# Patient Record
Sex: FEMALE | Race: ASIAN | NOT HISPANIC OR LATINO | URBAN - METROPOLITAN AREA
[De-identification: names, ages, dates, MRNs, and addresses within clinical notes are randomized per-mention and may not be internally consistent; named-entity substitution may affect disease eponyms.]

---

## 2022-01-20 ENCOUNTER — ANCILLARY PROCEDURE (OUTPATIENT)
Dept: GENERAL RADIOLOGY | Facility: CLINIC | Age: 52
End: 2022-01-20
Attending: FAMILY MEDICINE
Payer: COMMERCIAL

## 2022-01-20 ENCOUNTER — OFFICE VISIT (OUTPATIENT)
Dept: FAMILY MEDICINE | Facility: CLINIC | Age: 52
End: 2022-01-20
Payer: COMMERCIAL

## 2022-01-20 VITALS
TEMPERATURE: 98.3 F | RESPIRATION RATE: 16 BRPM | WEIGHT: 151.6 LBS | HEIGHT: 59 IN | SYSTOLIC BLOOD PRESSURE: 133 MMHG | HEART RATE: 68 BPM | OXYGEN SATURATION: 94 % | BODY MASS INDEX: 30.56 KG/M2 | DIASTOLIC BLOOD PRESSURE: 88 MMHG

## 2022-01-20 DIAGNOSIS — S52.502A CLOSED FRACTURE OF DISTAL END OF LEFT RADIUS, UNSPECIFIED FRACTURE MORPHOLOGY, INITIAL ENCOUNTER: ICD-10-CM
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
## 2022-01-20 PROCEDURE — 73090 X-RAY EXAM OF FOREARM: CPT | Mod: LT | Performed by: RADIOLOGY

## 2022-01-20 PROCEDURE — 99203 OFFICE O/P NEW LOW 30 MIN: CPT | Mod: GC

## 2022-01-20 RX ORDER — ACETAMINOPHEN 500 MG
500-1000 TABLET ORAL EVERY 6 HOURS PRN
Qty: 60 TABLET | Refills: 1 | Status: SHIPPED | OUTPATIENT
Start: 2022-01-20 | End: 2023-10-17

## 2022-01-20 RX ORDER — IBUPROFEN 600 MG/1
600 TABLET, FILM COATED ORAL EVERY 6 HOURS PRN
Qty: 60 TABLET | Refills: 1 | Status: SHIPPED | OUTPATIENT
Start: 2022-01-20 | End: 2023-11-28

## 2022-01-20 ASSESSMENT — MIFFLIN-ST. JEOR: SCORE: 1188.53

## 2022-01-20 NOTE — LETTER
January 20, 2022      Jillian Ree  [address]  [place] [address]              I saw Jillian on 1/20/2022 in my clinic. Please excuse her from work for 1 week.          Sincerely,      Rusty Tomas DO

## 2022-01-20 NOTE — PROGRESS NOTES
Assessment and Plan      Diagnoses and all orders for this visit:    Fall, initial encounter  Closed Fracture of Distal end of Left Radius  Fall on outstretched hand today. Tenderness and swelling with normal sensation of left hand. Tenderness over left anatomic snuff box. X-ray revealed minimal cortical displacement along the distal radial metaphysis suspicious for nondisplaced fracture. Patient splinted and will return in one week. Will reassess with new x ray.        -     XR Forearm Left 2 Views; Future  -     XR Wrist Right 2 Views; Future  -     acetaminophen (TYLENOL) 500 MG tablet; Take 1-2 tablets (500-1,000 mg) by mouth every 6 hours as needed for mild pain  -     ibuprofen (ADVIL/MOTRIN) 600 MG tablet; Take 1 tablet (600 mg) by mouth every 6 hours as needed for moderate pain    Options for treatment and follow-up care were reviewed with the patient. Patient engaged in the decision making process and verbalized understanding of the options discussed and agreed with the final plan.    Patient was staffed with attending physician Dr. Madonna Tomas, DO PGY1           HPI       Jillian Parekh is a 52 year old year old female w/ PMH of   Patient Active Problem List   Diagnosis     Chest pain     Premature atrial complexes    who presents for Left arm pain.    Jillian fell this morning while walking outside after slipping on ice. She states that she fell and put her left arm back to try and catch herself and fell on her outstretched arm. She notes increased swelling and pain, but reports no change in sensation. She states she is able to move her arm and fingers fine with just some difficulty secondary to pain.     A paOnde  was used for  this visit.             Review of Systems:   10 point ROS negative other than as specified above.         Physical Exam:   /88 (BP Location: Right arm, Patient Position: Sitting, Cuff Size: Adult Regular)   Pulse 68   Temp 98.3  F (36.8  C) (Oral)   Resp 16  "  Ht 1.491 m (4' 10.7\")   Wt 68.8 kg (151 lb 9.6 oz)   SpO2 94%   BMI 30.93 kg/m       Exam:  Constitutional: healthy, alert, no distress, and cooperative  Head: Normocephalic. No masses, lesions, tenderness or abnormalities  Neck: Neck supple. No adenopathy.  ENT: throat normal without erythema or exudate  Cardiovascular: RRR w/o audible murmur  Respiratory: bilateral clear lungs w/o wheezing, crackles or rhonchi; breathing comfortably on RA  Gastrointestinal: Abdomen soft, non-tender. BS normal.  Musculoskeletal:Left forearm swelling and tenderness. Normal sensation of LUE. Normal movement of all 5 digits. Does note tenderness in L anatomic snuff box.   Skin: no suspicious lesions or rashes  Neurologic: grossly normal CN; normal strength, sensation, & tone  Psychiatric: mentation appears normal and affect normal/bright        Results:      Results from this visit  Results for orders placed or performed in visit on 01/20/22   XR Forearm Left 2 Views     Status: None    Narrative    EXAM: XR FOREARM LEFT 2 VIEWS  LOCATION: [facility]  DATE/TIME: 1/20/2022 5:08 PM    INDICATION:  Fall, initial encounter  COMPARISON: None.      Impression    IMPRESSION: There is a small focus of minimal cortical displacement along the distal radial metaphysis suspicious for nondisplaced fracture. Wrist x-rays could assess further. Punctate likely chronic bone fragment projecting near and distal to the ulnar   styloid.         "

## 2022-01-20 NOTE — NURSING NOTE
Due to patient being non-English speaking/uses sign language, an  was used for this visit. Only for face-to-face interpretation by an external agency, date and length of interpretation can be found on the scanned worksheet.     name: Mariluz Em   Agency: [employer]  Language: Sheri   Telephone number:   Type of interpretation: Face-to-face, spoken

## 2022-01-20 NOTE — PROGRESS NOTES
Preceptor Attestation:    I discussed the patient with the resident and evaluated the patient in person. I was present for and supervised the entire procedure. I have verified the content of the note, which accurately reflects my assessment of the patient and the plan of care.   Supervising Physician:  William Peacock MD.

## 2022-01-24 ENCOUNTER — ANCILLARY PROCEDURE (OUTPATIENT)
Dept: GENERAL RADIOLOGY | Facility: CLINIC | Age: 52
End: 2022-01-24
Attending: FAMILY MEDICINE
Payer: COMMERCIAL
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
## 2022-01-24 PROCEDURE — 73100 X-RAY EXAM OF WRIST: CPT | Mod: RT | Performed by: RADIOLOGY

## 2023-10-17 ENCOUNTER — OFFICE VISIT (OUTPATIENT)
Dept: FAMILY MEDICINE | Facility: CLINIC | Age: 53
End: 2023-10-17
Payer: COMMERCIAL

## 2023-10-17 VITALS
BODY MASS INDEX: 30.73 KG/M2 | TEMPERATURE: 97.7 F | WEIGHT: 150.6 LBS | HEART RATE: 55 BPM | SYSTOLIC BLOOD PRESSURE: 109 MMHG | DIASTOLIC BLOOD PRESSURE: 64 MMHG | OXYGEN SATURATION: 98 %

## 2023-10-17 DIAGNOSIS — Z11.59 NEED FOR HEPATITIS C SCREENING TEST: ICD-10-CM

## 2023-10-17 DIAGNOSIS — Z11.4 SCREENING FOR HIV (HUMAN IMMUNODEFICIENCY VIRUS): ICD-10-CM

## 2023-10-17 DIAGNOSIS — Z12.11 SCREEN FOR COLON CANCER: ICD-10-CM

## 2023-10-17 DIAGNOSIS — Z12.31 VISIT FOR SCREENING MAMMOGRAM: Primary | ICD-10-CM

## 2023-10-17 DIAGNOSIS — M79.601 RIGHT ARM PAIN: ICD-10-CM
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
## 2023-10-17 DIAGNOSIS — Z12.4 CERVICAL CANCER SCREENING: ICD-10-CM

## 2023-10-17 PROCEDURE — 99214 OFFICE O/P EST MOD 30 MIN: CPT | Performed by: FAMILY MEDICINE

## 2023-10-17 NOTE — PROGRESS NOTES
Assessment & Plan       Right Arm Pain  Jillian Parekh presents today for right arm pain and chest pain. These are likely a Right Upper Extremity Radiculopathy given the burning sensation, numbness and tingling, and positive Spurling test. This is unlikely to be ACS or MI given negative leftward radiation, worsening with exertion, improvement with rest. She had a negative Stress Echo in 2/17/16. Will start her on Gabapentin 300mg daily and uptitrate to 300mg BID. If no improvement with this, will consider imaging and possible neurosurgical referral. Patient not currently interested in PT at this time because she does not have time to go see them with her job.   - Gabapentin 300mg at bedtime for 7 days, then 300mg BID for 21 days.  - Follow up in 1 month to assess arm pain and complete preventative health needs.     Abdi Hernandez MS3  [facility] [employer] [facility]    Bill Walsh is a 53 year old, presenting for the following health issues:  Chest Pain (Chest pain started x 4- 5 months. Pain radiating to right arm with tingling and numbing. )      10/17/2023     9:07 AM   Additional Questions   Roomed by sundar   Accompanied by self       HPI     Jillian presents today with right arm pain and right chest pain. 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 She describes the pain as burning with numbness and tingling. This also causes her arm to feel tired. This pain radiates all the way to her right chest and she feels as though the chest pain and arm pain are related. The pain randomly starts, especially when in bed, it can stop for a few minutes randomly, but then comes right back. She has tried creams and massage without relief. She is able to move her arm in all directions. She denies any pain radiation to her left chest, arm, neck, or jaw. She denies any shortness of breath or worsening pain with exertion. She denies any squeezing pressure in her chest.  Rest does not improve her pain.     Review of Systems   Constitutional, HEENT, cardiovascular, pulmonary, gi and gu systems are negative, except as otherwise noted.      Objective    /64 (BP Location: Left arm, Patient Position: Sitting, Cuff Size: Adult Large)   Pulse 55   Temp 97.7  F (36.5  C) (Tympanic)   Wt 68.3 kg (150 lb 9.6 oz)   SpO2 98%   BMI 30.73 kg/m    Body mass index is 30.73 kg/m .  Physical Exam   GENERAL: healthy, alert and no distress  NECK: no adenopathy, no asymmetry, masses, or scars and thyroid normal to palpation  RESP: lungs clear to auscultation - no rales, rhonchi or wheezes  CV: regular rate and rhythm, normal S1 S2, no S3 or S4, no murmur, click or rub, no peripheral edema and peripheral pulses strong  MS: normal muscle tone, normal range of motion, no edema, RUE exam shows no erythema, induration, or nodules and 4/5 strength in all motions, and LUE exam shows normal strength and muscle mass  NEURO: sensory exam grossly normal except light touch and pinprick of RUE causing burning pain, mentation intact. Positive Spurling test to right.     ----- Services Performed by a MEDICAL STUDENT in Presence of ATTENDING Physician-------

## 2023-10-17 NOTE — PROGRESS NOTES
I was present with the medical student who participated in the service and in the documentation of this note. I have verified the history and personally performed the physical exam and medical decision making, and have verified the content of the note, which accurately reflects my assessment of the patient and the plan of care.    Presenting for right arm pain, burning-like sensation.  Positive Spurling's on exam, but otherwise no focal neurologic deficits.  -- Will manage conservatively at this point.  Declines PT.  Add gabapentin.  -- Low concern for cardiac etiology.  Normal Stress Echo in past.  No dyspnea, occurrence with exertion, etc.  -- If not improving, consider C-spine imaging.     Christi Tapia MD

## 2023-10-22 RX ORDER — IBUPROFEN 600 MG/1
600 TABLET, FILM COATED ORAL EVERY 6 HOURS PRN
Qty: 90 TABLET | Refills: 0 | Status: SHIPPED | OUTPATIENT
Start: 2023-10-22 | End: 2023-12-11

## 2023-10-22 RX ORDER — GABAPENTIN 300 MG/1
CAPSULE ORAL
Qty: 49 CAPSULE | Refills: 0 | Status: SHIPPED | OUTPATIENT
Start: 2023-10-22 | End: 2023-11-09

## 2023-10-22 RX ORDER — ACETAMINOPHEN 500 MG
500-1000 TABLET ORAL EVERY 6 HOURS PRN
Qty: 60 TABLET | Refills: 1 | Status: SHIPPED | OUTPATIENT
Start: 2023-10-22 | End: 2023-12-11

## 2023-11-09 ENCOUNTER — OFFICE VISIT (OUTPATIENT)
Dept: FAMILY MEDICINE | Facility: CLINIC | Age: 53
End: 2023-11-09
Payer: COMMERCIAL

## 2023-11-09 VITALS
HEART RATE: 56 BPM | OXYGEN SATURATION: 97 % | TEMPERATURE: 97.7 F | WEIGHT: 150.4 LBS | DIASTOLIC BLOOD PRESSURE: 90 MMHG | SYSTOLIC BLOOD PRESSURE: 153 MMHG | RESPIRATION RATE: 18 BRPM | HEIGHT: 59 IN | BODY MASS INDEX: 30.32 KG/M2

## 2023-11-09 DIAGNOSIS — Z12.31 VISIT FOR SCREENING MAMMOGRAM: Primary | ICD-10-CM

## 2023-11-09 DIAGNOSIS — Z12.11 SCREEN FOR COLON CANCER: ICD-10-CM

## 2023-11-09 DIAGNOSIS — M79.601 RIGHT ARM PAIN: ICD-10-CM

## 2023-11-09 DIAGNOSIS — Z11.59 NEED FOR HEPATITIS C SCREENING TEST: ICD-10-CM

## 2023-11-09 DIAGNOSIS — Z11.4 SCREENING FOR HIV (HUMAN IMMUNODEFICIENCY VIRUS): ICD-10-CM

## 2023-11-09 DIAGNOSIS — Z23 NEED FOR PROPHYLACTIC VACCINATION AND INOCULATION AGAINST INFLUENZA: ICD-10-CM

## 2023-11-09 DIAGNOSIS — R03.0 ELEVATED BLOOD PRESSURE READING WITHOUT DIAGNOSIS OF HYPERTENSION: ICD-10-CM

## 2023-11-09 DIAGNOSIS — Z13.220 SCREENING FOR HYPERLIPIDEMIA: ICD-10-CM

## 2023-11-09 LAB
ANION GAP SERPL CALCULATED.3IONS-SCNC: 10 MMOL/L (ref 7–15)
BUN SERPL-MCNC: 9.9 MG/DL (ref 6–20)
CALCIUM SERPL-MCNC: 9.9 MG/DL (ref 8.6–10)
CHLORIDE SERPL-SCNC: 105 MMOL/L (ref 98–107)
CHOLEST SERPL-MCNC: 225 MG/DL
CREAT SERPL-MCNC: 0.78 MG/DL (ref 0.51–0.95)
DEPRECATED HCO3 PLAS-SCNC: 27 MMOL/L (ref 22–29)
EGFRCR SERPLBLD CKD-EPI 2021: 89 ML/MIN/1.73M2
GLUCOSE SERPL-MCNC: 98 MG/DL (ref 70–99)
HDLC SERPL-MCNC: 55 MG/DL
LDLC SERPL CALC-MCNC: 136 MG/DL
NONHDLC SERPL-MCNC: 170 MG/DL
POTASSIUM SERPL-SCNC: 4.6 MMOL/L (ref 3.4–5.3)
SODIUM SERPL-SCNC: 142 MMOL/L (ref 135–145)
TRIGL SERPL-MCNC: 171 MG/DL

## 2023-11-09 PROCEDURE — 80048 BASIC METABOLIC PNL TOTAL CA: CPT

## 2023-11-09 PROCEDURE — 80061 LIPID PANEL: CPT

## 2023-11-09 PROCEDURE — 99214 OFFICE O/P EST MOD 30 MIN: CPT | Mod: 25

## 2023-11-09 PROCEDURE — 86803 HEPATITIS C AB TEST: CPT

## 2023-11-09 PROCEDURE — 87389 HIV-1 AG W/HIV-1&-2 AB AG IA: CPT

## 2023-11-09 PROCEDURE — 90686 IIV4 VACC NO PRSV 0.5 ML IM: CPT

## 2023-11-09 PROCEDURE — 90471 IMMUNIZATION ADMIN: CPT

## 2023-11-09 PROCEDURE — 36415 COLL VENOUS BLD VENIPUNCTURE: CPT

## 2023-11-09 RX ORDER — GABAPENTIN 300 MG/1
CAPSULE ORAL
Qty: 49 CAPSULE | Refills: 0 | Status: SHIPPED | OUTPATIENT
Start: 2023-11-09 | End: 2023-12-11

## 2023-11-09 NOTE — PROGRESS NOTES
Preceptor Attestation:   Patient seen, evaluated and discussed with the resident. I have verified the content of the note, which accurately reflects my assessment of the patient and the plan of care.   Supervising Physician:  Charles Bethea MD    Gen: Denies fever, weight loss; +generalized malaise  HEENT: Denies vision changes, ear pain, epistaxis, sore throat  CV: Denies chest pain, palpitations  Skin: Denies rash, erythema, color changes  Resp: Denies SOB, cough  Endo: Denies sensitivity to heat, cold, increased urination  GI: Denies abdominal pain, constipation, vomiting, diarrhea; +nausea  Msk: Denies back pain, LE swelling, extremity pain  : Denies dysuria, increased frequency  Neuro: Denies LOC, weakness, numbness, tingling  Psych: Denies hx of psych, hallucinations  ROS statement: all other ROS negative except as per HPI

## 2023-11-09 NOTE — PROGRESS NOTES
Assessment & Plan     Right arm pain  In for follow-up of right arm pain likely radiculopathy as patient with burning sensation in the right arm.  Did not get gabapentin at last visit, we will reorder that and have patient call if they are unable to obtain that medication.  As for The consistency of her symptoms, she spoke last visit about possible neurosurgical referral or further imaging which patient declined at this time.  Would like to try medication management for 1 month first before resorting to that.  I think that that is well within reason and will follow-up in 1 month.  - gabapentin (NEURONTIN) 300 MG capsule; Take 1 capsule (300 mg) by mouth at bedtime for 7 days, THEN 1 capsule (300 mg) 2 times daily for 21 days.    Visit for screening mammogram  - MA SCREENING DIGITAL BILAT - Future  (s+30); Future    Screen for colon cancer  - Colonoscopy Screening  Referral; Future    Screening for HIV (human immunodeficiency virus)  - HIV Screening; Future  - HIV Screening    Need for hepatitis C screening test  - Hepatitis C Screen Reflex to HCV RNA Quant and Genotype; Future  - Hepatitis C Screen Reflex to HCV RNA Quant and Genotype    Need for prophylactic vaccination and inoculation against influenza  Flu shot today.    Screening for hyperlipidemia  - Lipid panel reflex to direct LDL Non-fasting; Future  - Lipid panel reflex to direct LDL Non-fasting    Elevated blood pressure reading without diagnosis of hypertension  Elevated blood pressure reading x2 today.  Previously with blood pressures mildly elevated.  Will recheck next time and have discussion of medication management.  BMP today.  - Basic metabolic panel; Future  - Basic metabolic panel    Return in about 4 weeks (around 12/7/2023) for Follow up.    Rusty Tomas DO  [facility] SKY Walsh is a 53 year old, presenting for the following health issues:  Follow Up (Arm pain, tingling. Still the same, pt didn't  " med arabella there was not medication when she )      11/9/2023     9:40 AM   Additional Questions   Roomed by ML   Accompanied by self       HPI   Here for follow-up of right arm pain.  Reports that she did not get the gabapentin as she went to the pharmacy and it was not available for her.  Continues to endorse similar symptoms.  No decrease in strength in that right arm but does have decreased sensation and numbness, tingling, burning.  It is intermittent but fairly consistent throughout the month as being on and off.  New symptoms.  No systemic symptoms.    Review of Systems   Constitutional, HEENT, cardiovascular, pulmonary, gi and gu systems are negative, except as otherwise noted.      Objective    BP (!) 153/90 (BP Location: Right arm, Patient Position: Sitting, Cuff Size: Adult Regular)   Pulse 56   Temp 97.7  F (36.5  C) (Oral)   Resp 18   Ht 1.49 m (4' 10.66\")   Wt 68.2 kg (150 lb 6.4 oz)   SpO2 97%   BMI 30.73 kg/m    Body mass index is 30.73 kg/m .  Physical Exam   GENERAL: healthy, alert and no distress  NECK: no adenopathy, no asymmetry, masses, or scars and thyroid normal to palpation  RESP: lungs clear to auscultation - no rales, rhonchi or wheezes  CV: regular rate and rhythm, normal S1 S2, no S3 or S4, no murmur, click or rub, no peripheral edema and peripheral pulses strong  ABDOMEN: soft, nontender, no hepatosplenomegaly, no masses and bowel sounds normal  MS: Normal strength bilateral upper extremities.  Reports different sensation and the entire right upper extremity including burning sensation.  Positive Spurling's test on the right.    No results found for this or any previous visit (from the past 24 hour(s)).    ----- Service Performed and Documented by Resident or Fellow ------              "

## 2023-11-10 LAB
HCV AB SERPL QL IA: NONREACTIVE
HIV 1+2 AB+HIV1 P24 AG SERPL QL IA: NONREACTIVE

## 2023-11-28 ENCOUNTER — ANCILLARY PROCEDURE (OUTPATIENT)
Dept: GENERAL RADIOLOGY | Facility: CLINIC | Age: 53
End: 2023-11-28
Payer: COMMERCIAL

## 2023-11-28 ENCOUNTER — OFFICE VISIT (OUTPATIENT)
Dept: FAMILY MEDICINE | Facility: CLINIC | Age: 53
End: 2023-11-28
Payer: COMMERCIAL

## 2023-11-28 VITALS
HEART RATE: 55 BPM | BODY MASS INDEX: 30.89 KG/M2 | WEIGHT: 151.2 LBS | TEMPERATURE: 97.8 F | OXYGEN SATURATION: 97 % | SYSTOLIC BLOOD PRESSURE: 134 MMHG | RESPIRATION RATE: 24 BRPM | DIASTOLIC BLOOD PRESSURE: 79 MMHG
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
## 2023-11-28 DIAGNOSIS — R07.89 CHEST WALL PAIN: ICD-10-CM
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
## 2023-11-28 PROBLEM — M79.18 MYOFASCIAL PAIN: Status: ACTIVE | Noted: 2019-01-11

## 2023-11-28 PROBLEM — H91.93 BILATERAL HEARING LOSS: Status: ACTIVE | Noted: 2019-01-11

## 2023-11-28 PROCEDURE — 71110 X-RAY EXAM RIBS BIL 3 VIEWS: CPT | Mod: TC | Performed by: RADIOLOGY

## 2023-11-28 PROCEDURE — 99213 OFFICE O/P EST LOW 20 MIN: CPT | Mod: GC

## 2023-11-28 RX ORDER — IBUPROFEN 600 MG/1
600 TABLET, FILM COATED ORAL EVERY 6 HOURS PRN
Qty: 60 TABLET | Refills: 1 | Status: SHIPPED | OUTPATIENT
Start: 2023-11-28

## 2023-11-28 RX ORDER — ACETAMINOPHEN 500 MG
500-1000 TABLET ORAL EVERY 6 HOURS PRN
Qty: 90 TABLET | Refills: 0 | Status: SHIPPED | OUTPATIENT
Start: 2023-11-28

## 2023-11-28 NOTE — PROGRESS NOTES
Preceptor Attestation:    I discussed the patient with the resident and evaluated the patient in person. I personally reviewed the imaging and agree with the interpretation documented by the resident. I have verified the content of the note, which accurately reflects my assessment of the patient and the plan of care.   Supervising Physician:  Jimmy Grewal MD.

## 2023-11-28 NOTE — PROGRESS NOTES
"  Assessment & Plan     Fall, initial encounter  Chest wall pain  R/o rib fracture  Patient fell 11/22 in yard on concrete on left side.  Noticed mild pain immediately on impact, grew more severe as the week went on.  No pain with deep breaths, does report severe pain with cough.  Concern for rib fracture.  Fifth rib pain along axillary line, good aeration, no pain with deep breaths on exam.  X-ray findings to rule out fracture, symptoms seem more consistent with MSK injury.  Advised ibuprofen and Tylenol for now.  If rib fracture found, will recommend low-dose oxycodone if needed.  - ibuprofen (ADVIL/MOTRIN) 600 MG tablet; Take 1 tablet (600 mg) by mouth every 6 hours as needed for moderate pain  - acetaminophen (TYLENOL) 500 MG tablet; Take 1-2 tablets (500-1,000 mg) by mouth every 6 hours as needed for mild pain  - XR Ribs Bilateral 2 Views; Future    Diagnosis or treatment significantly limited by social determinants of health - limited English, physically demanding job  Ordering of each unique test  Prescription drug management  30 minutes spent by me on the date of the encounter doing chart review, history and exam, documentation and further activities per the note       BMI:   Estimated body mass index is 30.89 kg/m  as calculated from the following:    Height as of 11/9/23: 1.49 m (4' 10.66\").    Weight as of this encounter: 68.6 kg (151 lb 3.2 oz).   Weight management plan: Patient was referred to their PCP to discuss a diet and exercise plan.    MEDICATIONS:   Orders Placed This Encounter   Medications    ibuprofen (ADVIL/MOTRIN) 600 MG tablet     Sig: Take 1 tablet (600 mg) by mouth every 6 hours as needed for moderate pain     Dispense:  60 tablet     Refill:  1    acetaminophen (TYLENOL) 500 MG tablet     Sig: Take 1-2 tablets (500-1,000 mg) by mouth every 6 hours as needed for mild pain     Dispense:  90 tablet     Refill:  0          - Continue other medications without change    Return if symptoms " worsen or fail to improve.    Haley Hoffman MD  PGY3 Family Medicine Resident  [facility] SKY Walsh is a 53 year old, presenting for the following health issues:  Fall (Pt fell on 11/22/23 on the ground and hurt her Left side ribs - worried she may have broken a rib.  ) and Chest Pain (Rib pain - pain whenever she coughs there is a lot of pain. When she breaths there is a little discomfort. )        11/28/2023    11:32 AM   Additional Questions   Roomed by COLTON garcía MA   Accompanied by Self       HPI   Fall with rib pain    Patient was at work outside in the yard when fell 11/22. Slipped on ice and hit L chest wall on ground (concrete). Tried to protect self with arm. Pain immediately afterwards, was mild, grew in severity over the past few days. Pain with cough, not so much with deep breath, pain with reaching and holding heavy things.       Review of Systems   Constitutional, HEENT, cardiovascular, pulmonary, gi and gu systems are negative, except as otherwise noted.      Objective    /79   Pulse 55   Temp 97.8  F (36.6  C) (Oral)   Resp 24   Wt 68.6 kg (151 lb 3.2 oz)   SpO2 97%   BMI 30.89 kg/m    Body mass index is 30.89 kg/m .  Physical Exam   GENERAL: healthy, alert and no distress  RESP: lungs clear to auscultation - no rales, rhonchi or wheezes, good deep breaths without pain  CV: regular rate and rhythm, normal S1 S2, no S3 or S4, no murmur, click or rub, no peripheral edema and peripheral pulses strong  ABDOMEN: soft, nontender, no hepatosplenomegaly, no masses and bowel sounds normal  MS: no gross musculoskeletal defects noted, no edema, chest wall tender to palpation at 5th rib at axillary line  PSYCH: mentation appears normal, affect normal/bright    No results found for this visit on 11/28/23.    ----- Service Performed and Documented by Resident or Fellow ------

## 2023-11-28 NOTE — PATIENT INSTRUCTIONS
He was your x-ray results within the next day.  In the meantime, you can take Tylenol and ibuprofen as needed for your pain.

## 2023-12-11 ENCOUNTER — OFFICE VISIT (OUTPATIENT)
Dept: FAMILY MEDICINE | Facility: CLINIC | Age: 53
End: 2023-12-11
Payer: COMMERCIAL

## 2023-12-11 VITALS
DIASTOLIC BLOOD PRESSURE: 86 MMHG | TEMPERATURE: 98.2 F | HEIGHT: 59 IN | SYSTOLIC BLOOD PRESSURE: 137 MMHG | HEART RATE: 57 BPM | RESPIRATION RATE: 18 BRPM | OXYGEN SATURATION: 98 % | WEIGHT: 147.8 LBS | BODY MASS INDEX: 29.8 KG/M2

## 2023-12-11 DIAGNOSIS — M54.12 CERVICAL RADICULOPATHY: ICD-10-CM

## 2023-12-11 DIAGNOSIS — M79.601 RIGHT ARM PAIN: ICD-10-CM

## 2023-12-11 DIAGNOSIS — E78.5 HYPERLIPIDEMIA LDL GOAL <130: Primary | ICD-10-CM

## 2023-12-11 LAB — HBA1C MFR BLD: 5.9 % (ref 0–5.6)

## 2023-12-11 PROCEDURE — 36415 COLL VENOUS BLD VENIPUNCTURE: CPT

## 2023-12-11 PROCEDURE — 83036 HEMOGLOBIN GLYCOSYLATED A1C: CPT

## 2023-12-11 PROCEDURE — 99214 OFFICE O/P EST MOD 30 MIN: CPT | Mod: 25

## 2023-12-11 PROCEDURE — 90715 TDAP VACCINE 7 YRS/> IM: CPT

## 2023-12-11 PROCEDURE — 90471 IMMUNIZATION ADMIN: CPT

## 2023-12-11 RX ORDER — GABAPENTIN 300 MG/1
300 CAPSULE ORAL 3 TIMES DAILY
Qty: 90 CAPSULE | Refills: 0 | Status: SHIPPED | OUTPATIENT
Start: 2023-12-11 | End: 2024-01-10

## 2023-12-11 NOTE — PROGRESS NOTES
Prior to immunization administration, verified patients identity using patient s name and date of birth. Please see Immunization Activity for additional information.     Screening Questionnaire for Adult Immunization    Are you sick today?   No   Do you have allergies to medications, food, a vaccine component or latex?   No   Have you ever had a serious reaction after receiving a vaccination?   No   Do you have a long-term health problem with heart, lung, kidney, or metabolic disease (e.g., diabetes), asthma, a blood disorder, no spleen, complement component deficiency, a cochlear implant, or a spinal fluid leak?  Are you on long-term aspirin therapy?   No   Do you have cancer, leukemia, HIV/AIDS, or any other immune system problem?   No   Do you have a parent, brother, or sister with an immune system problem?   No   In the past 3 months, have you taken medications that affect  your immune system, such as prednisone, other steroids, or anticancer drugs; drugs for the treatment of rheumatoid arthritis, Crohn s disease, or psoriasis; or have you had radiation treatments?   No   Have you had a seizure, or a brain or other nervous system problem?   No   During the past year, have you received a transfusion of blood or blood    products, or been given immune (gamma) globulin or antiviral drug?   No   For women: Are you pregnant or is there a chance you could become       pregnant during the next month?   No   Have you received any vaccinations in the past 4 weeks?   No     Immunization questionnaire answers were all negative.      Patient instructed to remain in clinic for 15 minutes afterwards, and to report any adverse reactions.     Screening performed by Stewart Caldera on 12/11/2023 at 10:31 AM.

## 2023-12-11 NOTE — PROGRESS NOTES
Preceptor Attestation:    I discussed the patient with the resident and evaluated the patient in person. I have verified the content of the note, which accurately reflects my assessment of the patient and the plan of care.   Supervising Physician:  Donny Jarvis MD.

## 2023-12-11 NOTE — PROGRESS NOTES
"  Assessment & Plan     Cervical radiculopathy  Right arm pain  Reporting improved pain on gabapentin.  Will increase dose to 300 3 times daily.  Again declining further imaging or referral.  - gabapentin (NEURONTIN) 300 MG capsule; Take 1 capsule (300 mg) by mouth 3 times daily for 30 days    Hyperlipidemia LDL goal <130  History of hyperlipidemia and elevated glucose.  Will check with A1c.  - Hemoglobin A1c; Future    Return in about 4 weeks (around 1/8/2024) for Follow up, with me.    Rusty Tomas DO  [facility] SKY Walsh is a 53 year old, presenting for the following health issues:  Arm Pain (Pain, tingling  still the same ) and X-ray Results (Pt would like to know her result )    HPI   She is in to discuss follow-up of her recent fall in the x-ray imaging.  We discussed that this was negative for any fracture.  She still endorses mild left-sided chest wall pain.  Tender to palpation.  This is overall improving.  Has tried Tylenol and ibuprofen with mild improvement.    Also discussing right arm burning pain and tingling.  Reports that the gabapentin 300 mg has improved her pain.  Again declines any further workup or management at this time as she would just like to consider conservative management.  Reports that this is improved significantly with her gabapentin.  Denies any other symptoms.    Review of Systems   Constitutional, HEENT, cardiovascular, pulmonary, gi and gu systems are negative, except as otherwise noted.      Objective    /86 (BP Location: Right arm, Patient Position: Sitting, Cuff Size: Adult Regular)   Pulse 57   Temp 98.2  F (36.8  C) (Oral)   Resp 18   Ht 1.49 m (4' 10.66\")   Wt 67 kg (147 lb 12.8 oz)   SpO2 98%   BMI 30.20 kg/m    Body mass index is 30.2 kg/m .  Physical Exam   GENERAL: healthy, alert and no distress  NECK: no adenopathy, no asymmetry, masses, or scars and thyroid normal to palpation  RESP: lungs clear to auscultation - no " rales, rhonchi or wheezes  CV: regular rate and rhythm, normal S1 S2, no S3 or S4, no murmur, click or rub, no peripheral edema and peripheral pulses strong  ABDOMEN: soft, nontender, no hepatosplenomegaly, no masses and bowel sounds normal  MS: Normal sensation and strength in bilateral upper extremities.  Reporting burning sensation into the right hand.  Tenderness to palpation over left lateral ribs.    No results found for this or any previous visit (from the past 24 hour(s)).    ----- Service Performed and Documented by Resident or Fellow ------

## 2023-12-11 NOTE — COMMUNITY RESOURCES LIST (ENGLISH)
12/11/2023   [facility] - Outpatient Clinics  N/A  For additional resource needs, please contact your health insurance member services or your primary care team.  Phone: [phone]   Email: N/A   Address: [facility] [place] [address]   Hours: N/A        Hotlines and Helplines       Hotline - Housing crisis  1  [facility] Distance: 6.83 miles      Phone/Virtual   [address] [place] [address]  Language: English  Hours: Mon - Sun Open 24 Hours   Phone: (629) 398-6423 Email: communications@[facility].[facility] Website: https://[facility].org/oursaviourshousing/     2  [facility] Distance: 8.43 miles      Phone/Virtual   [address] [place] [address]  Language: English  Hours: Mon - Sun Open 24 Hours   Phone: (392) 157-7763 Email: info@[facility].org Website: http://www.[facility].org          Housing       Coordinated Entry access point  3  [facility] Distance: 1.48 miles      In-Person, Phone/Virtual   [address] [place] [address]  Language: English  Hours: Mon - Fri 8:30 AM - 4:30 PM  Fees: Free   Phone: (253) 140-5506 Email: info@[facility].org Website: https://www.[facility].org/locations/[facility]/     4  [facility] - Coordinated Access to Housing and Shelter ([facility]) - Coordinated Access - Coordinated Entry access point Distance: 1.61 miles      In-Person, Phone/Virtual   [address] [place] [address]  Language: English  Hours: Mon - Fri 8:00 AM - 4:30 PM  Fees: Free   Phone: (413) 650-2645 Website: https://www.[facility]./residents/assistance-support/assistance/housing-services-support     Drop-in center or day shelter  5  [facility] Distance: 2.36 miles      In-Person   [address] [place] [address]  Language: English  Hours: Mon - Fri 9:00 AM - 4:00 PM  Fees: Free   Phone: (363) 027-9810 Email: frontelfegok@[facility].org Website:  http://[facility].org     6  [facility] Distance: 6.87 miles      In-Person   [address] [place] [address]  Language: English, Cook Islander, Indonesian  Hours: Mon - Sat 7:00 AM - 3:00 PM  Fees: Free, Self Pay   Phone: (786) 907-2194 Email: info@[employer] Website: https://www.[facility]/locations/[facility]/     Housing search assistance  7  [employer] - https://[employer]/ Distance: 7.05 miles      Phone/Virtual   [address] [place] [address]  Language: English  Hours: Mon - Sun Open 24 Hours   Email: info@[employer] Website: https://[employer]un     8  [employer] - Online housing search assistance Distance: 8.21 miles      Phone/Virtual   [address] [address]  Language: English, Hmong, Cook Islander, Indonesian  Hours: Mon - Sun Open 24 Hours   Phone: (772) 798-7086 Email: info@[employer] Website: http://www.[employer].org/     Shelter for families  9  [facility] Distance: 14.88 miles      In-Person   [address] [place] [address]  Language: English  Hours: Mon - Fri 3:00 PM - 9:00 AM , Sat - Sun Open 24 Hours  Fees: Free   Phone: (417) 428-1268 Ext.1 Website: https://www.[facility].org/2020/07/03/emergency-family-shelter/     Shelter for individuals  10  [facility] [place] [facility] [place] [facility] Distance: 1.44 miles      In-Person   [address] [place] [address]  Language: English  Hours: Mon - Sun 5:00 PM - 10:00 AM  Fees: Free, Self Pay   Phone: (675) 954-2900 Email: info@[employer] Website: https://www.[employer]/locations/[facility]/     11  [facility] - Coordinated Access to Housing and Shelter ([facility]) - Coordinated Access - Emergency housing Distance: 1.61 miles       In-Person, Phone/Virtual   [address] [place] [address]  Language: English  Hours: Mon - Fri 8:00 AM - 4:30 PM  Fees: Free   Phone: (129) 990-4269 Website: https://www.[employer]./residents/assistance-support/assistance/housing-services-support          Important Numbers & Websites       [address] [place] [facility]  Poison Control   (642) 152-9020 [place]  Suicide and Crisis Lifeline   [address] [place], [place] [address]line.org  [facility] [place] Child Abuse Hotline   745.209.8950 Childhelphotline.org  [place] Sexual Assault Hotline   (778) 939-1377 (HOPE) [facility] Runaway Safeline   (458) 941-8603 (RUNAWAY) [facility].org  Pregnancy & Postpartum Support [place]   Call/text 623-469-3776 Ppsupport[place].org  Substance Abuse National Helpline ([facility]   767-134-HELP (5358) Findtreatment.gov  Emergency Services   911

## 2024-10-28 ENCOUNTER — OFFICE VISIT (OUTPATIENT)
Dept: FAMILY MEDICINE | Facility: CLINIC | Age: 54
End: 2024-10-28
Payer: COMMERCIAL

## 2024-10-28 VITALS
HEART RATE: 50 BPM | SYSTOLIC BLOOD PRESSURE: 129 MMHG | DIASTOLIC BLOOD PRESSURE: 71 MMHG | BODY MASS INDEX: 30.86 KG/M2 | WEIGHT: 147 LBS | HEIGHT: 58 IN | OXYGEN SATURATION: 97 % | RESPIRATION RATE: 16 BRPM | TEMPERATURE: 98 F

## 2024-10-28 DIAGNOSIS — R03.0 ELEVATED BLOOD PRESSURE READING WITHOUT DIAGNOSIS OF HYPERTENSION: ICD-10-CM

## 2024-10-28 DIAGNOSIS — E78.5 HYPERLIPIDEMIA LDL GOAL <130: Primary | ICD-10-CM

## 2024-10-28 DIAGNOSIS — Z23 ENCOUNTER FOR ADMINISTRATION OF VACCINE: ICD-10-CM

## 2024-10-28 LAB
EST. AVERAGE GLUCOSE BLD GHB EST-MCNC: 123 MG/DL
HBA1C MFR BLD: 5.9 % (ref 0–5.6)

## 2024-10-28 PROCEDURE — 80061 LIPID PANEL: CPT

## 2024-10-28 PROCEDURE — 90471 IMMUNIZATION ADMIN: CPT

## 2024-10-28 PROCEDURE — 90656 IIV3 VACC NO PRSV 0.5 ML IM: CPT

## 2024-10-28 PROCEDURE — 99213 OFFICE O/P EST LOW 20 MIN: CPT | Mod: 25

## 2024-10-28 PROCEDURE — 83036 HEMOGLOBIN GLYCOSYLATED A1C: CPT

## 2024-10-28 PROCEDURE — 36415 COLL VENOUS BLD VENIPUNCTURE: CPT

## 2024-10-28 NOTE — PROGRESS NOTES
"Preceptor Attestation:  Vitals:    10/28/24 0813   BP: 129/71   BP Location: Left arm   Patient Position: Sitting   Cuff Size: Adult Regular   Pulse: 50   Resp: 16   Temp: 98  F (36.7  C)   TempSrc: Oral   SpO2: 97%   Weight: 66.7 kg (147 lb)   Height: 1.473 m (4' 10\")          I discussed the patient with the resident and evaluated the patient in person. I have verified the content of the note, which accurately reflects my assessment of the patient and the plan of care.   Supervising Physician:  Haley Du MD    "

## 2024-10-28 NOTE — PROGRESS NOTES
Assessment & Plan     Hyperlipidemia LDL goal <130  Last  1 year ago. A1c prediabetic range as well. Will update labs today, fasting. She is open to starting medications if indicated such as  a statin but with current data I calculate her ascvd risk to be low risk (2.5%). we reviewed lifestyle management and she is open to increase the amount she walks and will try to avoid high carbohydrate foods. She is due for pap and annual wellness so will schedule today for 1 month from today  - Lipid panel reflex to direct LDL Non-fasting  - Hemoglobin A1c    Elevated blood pressure reading without diagnosis of hypertension  Pressures improved from last year when they were 150s - 160s now today 120s-130s. She is not on medication. She is asymptomatic. She endorses being able to perform 3-5 mets without problems. No significant family hx. We discussed continuing to pursue lifestyle management. She is agreeable.    Encounter for admin vaccine  -flu shot    Return in about 4 weeks (around 11/25/2024) for Routine preventive.    Bill Walsh is a 54 year old, presenting for the following health issues:  Diabetes (Follow up DM and BP)        12/11/2023     9:59 AM   Additional Questions   Roomed by ML   Accompanied by self     HPI       Here for BP and follow up of prediabetes. Had A1c of 5.9 last year in Dec. Last . Fasting today. BP mildly elevated today 129/71. She says she has not made any changes to her diet, which normally includes rice, veggies and meat. Does not drink sugary beverages. Does not drink coffee. Does not drink alcohol. Does not smoke currently, she quit over 10 years ago. When she did smoke she smoked cigars, about 10 per day.     No routine exercise participation, works as a . She is able to perform 3-5 mets without chest pain or SOB. She is open to walking more. Lives at home with  and 3 children.     No family hx of diabetes or heart problems. She is willing to start a  "medication if needed.    Review of Systems  Constitutional, HEENT, cardiovascular, pulmonary, gi and gu systems are negative, except as otherwise noted.      Objective    /71 (BP Location: Left arm, Patient Position: Sitting, Cuff Size: Adult Regular)   Pulse 50   Temp 98  F (36.7  C) (Oral)   Resp 16   Ht 1.473 m (4' 10\")   Wt 66.7 kg (147 lb)   SpO2 97%   BMI 30.72 kg/m    Body mass index is 30.72 kg/m .  Physical Exam   GENERAL: alert and no distress  NECK: no adenopathy, no asymmetry, masses, or scars  RESP: lungs clear to auscultation - no rales, rhonchi or wheezes  CV: regular rate and rhythm, normal S1 S2, no S3 or S4, no murmur, click or rub, no peripheral edema  ABDOMEN: soft, nontender, no hepatosplenomegaly, no masses and bowel sounds normal  MS: no gross musculoskeletal defects noted, no edema  SKIN: no suspicious lesions or rashes  NEURO: Normal strength and tone, mentation intact and speech normal  PSYCH: mentation appears normal, affect normal/bright            Signed Electronically by: Virgilio Wyatt MD    "

## 2024-10-29 LAB
CHOLEST SERPL-MCNC: 217 MG/DL
FASTING STATUS PATIENT QL REPORTED: ABNORMAL
HDLC SERPL-MCNC: 53 MG/DL
LDLC SERPL CALC-MCNC: 131 MG/DL
NONHDLC SERPL-MCNC: 164 MG/DL
TRIGL SERPL-MCNC: 166 MG/DL